# Patient Record
Sex: MALE | Race: ASIAN | NOT HISPANIC OR LATINO | URBAN - METROPOLITAN AREA
[De-identification: names, ages, dates, MRNs, and addresses within clinical notes are randomized per-mention and may not be internally consistent; named-entity substitution may affect disease eponyms.]

---

## 2017-04-06 ENCOUNTER — EMERGENCY (EMERGENCY)
Facility: HOSPITAL | Age: 22
LOS: 1 days | Discharge: HOME | End: 2017-04-06
Admitting: EMERGENCY MEDICINE
Payer: COMMERCIAL

## 2017-04-06 ENCOUNTER — EMERGENCY (EMERGENCY)
Facility: HOSPITAL | Age: 22
LOS: 1 days | Discharge: HOME | End: 2017-04-06
Attending: EMERGENCY MEDICINE | Admitting: EMERGENCY MEDICINE
Payer: MEDICAID

## 2017-04-06 VITALS
DIASTOLIC BLOOD PRESSURE: 71 MMHG | TEMPERATURE: 99 F | RESPIRATION RATE: 16 BRPM | SYSTOLIC BLOOD PRESSURE: 111 MMHG | HEART RATE: 81 BPM | OXYGEN SATURATION: 100 %

## 2017-04-06 VITALS
SYSTOLIC BLOOD PRESSURE: 148 MMHG | RESPIRATION RATE: 16 BRPM | TEMPERATURE: 98 F | OXYGEN SATURATION: 98 % | HEART RATE: 90 BPM | DIASTOLIC BLOOD PRESSURE: 84 MMHG

## 2017-04-06 PROCEDURE — 99053 MED SERV 10PM-8AM 24 HR FAC: CPT

## 2017-04-06 PROCEDURE — 99283 EMERGENCY DEPT VISIT LOW MDM: CPT | Mod: 25

## 2017-04-06 PROCEDURE — 99283 EMERGENCY DEPT VISIT LOW MDM: CPT

## 2017-04-06 PROCEDURE — 70450 CT HEAD/BRAIN W/O DYE: CPT | Mod: 26
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
## 2017-04-06 NOTE — ED PROVIDER NOTE - PLAN OF CARE
Rest, drink plenty of fluids.  Advance activity as tolerated.  Continue all previously prescribed medications as directed.  Take Motrin 600 mg (three 200 mg over the counter pills) every 8 hours as needed for moderate pain -- take with food. Take Tylenol 650mg (Two 325 mg pills) every 4-6 hours as needed for pain. You may have a headache associated with nausea in the next few hours/days. This is called a concussion and does not warrant return to the ED unless you develop significant worsening of pain, profuse vomiting, dizziness, changes in vision, difficulty walking/speaking,  weakness or numbness to your extremities.  Please avoid any contact sports or heavy lifting until re-evaluated and cleared by your primary care provider. Follow up with your primary care physician and neurology (referral list provided) in 48-72 hours- bring copies of your results.  Return to the ER for worsening or persistent symptoms, including but not limited to numbness, weakness, visual/auditory changes, worsening/persistent headache, and/or ANY NEW OR CONCERNING SYMPTOMS. If you have issues obtaining follow up, please call: [phone] to obtain a doctor or specialist who takes your insurance in your area.
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
## 2017-04-06 NOTE — ED PROVIDER NOTE - MEDICAL DECISION MAKING DETAILS
Pt is a 20 y/o M nonsmoker no PMHx p/w headache and lightheadedness today -- likely post-concussive syndrome, given questionable loc, will do head CT; left temporal pain likely 2/2 contusion; no e/o GCA, no neuro deficits, no e/o vertigo
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
## 2017-04-06 NOTE — ED PROVIDER NOTE - PROGRESS NOTE DETAILS
ISADORA Byrd:  CT shows no acute pathology.  Repeat neuro exam normal:  CN II-XII normal.  EOMI.  PERRLA.  Motor 5/5 UE and LE.  Sensation intact diffusely throughout.  Negative Rhomberg.  Negative pronator drift.  Point to point intact.  Pt ambulatory without assistance and without any gait disturbance; no ataxia.  Pt medically stable for discharge.  Pt to follow up with PMD.  Concussion precautions given to pt.
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
## 2017-04-06 NOTE — ED PROVIDER NOTE - CARE PLAN
Principal Discharge DX:	Post concussion syndrome  Instructions for follow-up, activity and diet:	Rest, drink plenty of fluids.  Advance activity as tolerated.  Continue all previously prescribed medications as directed.  Take Motrin 600 mg (three 200 mg over the counter pills) every 8 hours as needed for moderate pain -- take with food. Take Tylenol 650mg (Two 325 mg pills) every 4-6 hours as needed for pain. You may have a headache associated with nausea in the next few hours/days. This is called a concussion and does not warrant return to the ED unless you develop significant worsening of pain, profuse vomiting, dizziness, changes in vision, difficulty walking/speaking,  weakness or numbness to your extremities.  Please avoid any contact sports or heavy lifting until re-evaluated and cleared by your primary care provider. Follow up with your primary care physician and neurology (referral list provided) in 48-72 hours- bring copies of your results.  Return to the ER for worsening or persistent symptoms, including but not limited to numbness, weakness, visual/auditory changes, worsening/persistent headache, and/or ANY NEW OR CONCERNING SYMPTOMS. If you have issues obtaining follow up, please call: [phone] to obtain a doctor or specialist who takes your insurance in your area. Principal Discharge DX:	Post concussion syndrome  Instructions for follow-up, activity and diet:	Rest, drink plenty of fluids.  Advance activity as tolerated.  Continue all previously prescribed medications as directed.  Take Motrin 600 mg (three 200 mg over the counter pills) every 8 hours as needed for moderate pain -- take with food. Take Tylenol 650mg (Two 325 mg pills) every 4-6 hours as needed for pain. You may have a headache associated with nausea in the next few hours/days. This is called a concussion and does not warrant return to the ED unless you develop significant worsening of pain, profuse vomiting, dizziness, changes in vision, difficulty walking/speaking,  weakness or numbness to your extremities.  Please avoid any contact sports or heavy lifting until re-evaluated and cleared by your primary care provider. Follow up with your primary care physician and neurology (referral list provided) in 48-72 hours- bring copies of your results.  Return to the ER for worsening or persistent symptoms, including but not limited to numbness, weakness, visual/auditory changes, worsening/persistent headache, and/or ANY NEW OR CONCERNING SYMPTOMS. If you have issues obtaining follow up, please call: [phone] to obtain a doctor or specialist who takes your insurance in your area.

## 2017-04-06 NOTE — ED PROVIDER NOTE - NONTENDER LOCATION
right upper quadrant/umbilical/right costovertebral angle/left lower quadrant/suprapubic/left upper quadrant/periumbilical/right lower quadrant/left costovertebral angle
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
## 2017-08-30 NOTE — ED PROVIDER NOTE - PSH
No significant past surgical history
Skin normal color for race, warm, dry and intact. No evidence of rash.

## 2018-08-31 ENCOUNTER — EMERGENCY (EMERGENCY)
Facility: HOSPITAL | Age: 23
LOS: 1 days | Discharge: HOME | End: 2018-08-31
Attending: EMERGENCY MEDICINE
Payer: COMMERCIAL

## 2018-08-31 VITALS
TEMPERATURE: 98 F | RESPIRATION RATE: 17 BRPM | WEIGHT: 190.04 LBS | HEIGHT: 70 IN | SYSTOLIC BLOOD PRESSURE: 136 MMHG | HEART RATE: 83 BPM | DIASTOLIC BLOOD PRESSURE: 94 MMHG | OXYGEN SATURATION: 95 %

## 2018-08-31 PROCEDURE — 99284 EMERGENCY DEPT VISIT MOD MDM: CPT | Mod: 25

## 2018-09-01 VITALS
RESPIRATION RATE: 16 BRPM | TEMPERATURE: 98 F | DIASTOLIC BLOOD PRESSURE: 75 MMHG | OXYGEN SATURATION: 98 % | HEART RATE: 66 BPM | SYSTOLIC BLOOD PRESSURE: 125 MMHG

## 2018-09-01 PROCEDURE — 99284 EMERGENCY DEPT VISIT MOD MDM: CPT | Mod: 25

## 2018-09-01 PROCEDURE — 72125 CT NECK SPINE W/O DYE: CPT

## 2018-09-01 PROCEDURE — 72125 CT NECK SPINE W/O DYE: CPT | Mod: 26

## 2018-09-01 NOTE — ED ADULT NURSE REASSESSMENT NOTE - NS ED NURSE REASSESS COMMENT FT1
Report received from DICK Her. Pt resting comfortably with family at bedside. Awaiting radiology results. Safety maintained at all times, bed in lowest position. Will continue to monitor closely.
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
## 2018-12-05 NOTE — ED PROVIDER NOTE - NEUROLOGICAL GAIT WEIGHT BEARING
H&P signed by Darryl Blas MD at 18 09:08 AM      Author:  Darryl Blas MD Service:  (none) Author Type:  Physician     Filed:  18 09:08 AM Encounter Date:  3/10/2018 Status:  Signed     :  Darryl Blas MD (Physician)                                                                 [place] [id number]     NAME:  MADI COON      ADMISSION DATE:  2018     PHYSICIAN:  Darryl Blas M.D.     ROOM:  A[address]     YOB: 1960     MED REC#:  [id number]       ACCT #:  [id number]                               HISTORY AND PHYSICAL     HISTORY OF PRESENT ILLNESS:  The patient is a 58-year-old  male  with no significant previous medical history except for hypertension and  some urinary frequency.  Patient with no recent travel, illness, or  exposure.  No fevers, chills, or night sweats.  No dysuria, frequency,  or urgency.  No diarrhea.  No nausea or vomiting.  Approximately 2 days  ago, patient began with some epigastric lower quadrant pain, which was  somewhat crampy in nature.  Pain became somewhat worse and eventually  came to the left lower quadrant.  Initially said left lower quadrant,  but the way he points, it is suprapubic.  The patient had a CT scan,  which showed a 14 mm appendix with appendiceal inflammation and  appendicoliths.  Bowel and colon all looked normal.  No hernia.     ALLERGIES:  No known drug allergies.     PAST MEDICAL HISTORY:  Had previous back surgery.  He takes lisinopril  and something for his prostate.  He does not remember what its for  frequency and it is not Flomax.  No previous abdominal surgery.     SOCIAL HISTORY:  .     FAMILY HISTORY:  Parents are .     ALLERGIES:  No known drug allergies.     REVIEW OF SYSTEMS:  He has abdominal pain and some nausea.  Otherwise, 12-point is negative.     PHYSICAL  EXAMINATION:  General:   male, in no acute distress.  Vital Signs:  Temperature is 98, heart rate 85, and respirations 18.  HEENT:  Normocephalic, atraumatic.  Pupils equal and reactive to light  and accommodation.  Extraocular muscles are intact.  Sclerae nonicteric.  TMs clear.  Neck:  Supple.  Trachea is midline.  No bruit.  No adenopathy.  Heart:  Regular without murmur, gallop, or rub.  Lungs:  Clear.  Abdomen:  Soft, tender suprapubically and over McBurney's.  Extremities:  Warm and dry without cyanosis, clubbing, edema.  Cranial  nerves 2 through 12 are normal.  Toes are downgoing.     LABORATORY DATA:  White count is 9.0 with a left shift.  LFTs are  normal.  BUN is 15, creatinine is 0.85, glucose is 118, sodium is 142,  potassium 4.2.  Urinalysis is negative.     CT as above.     IMPRESSION:  Acute appendicitis.     PLAN:  Laparoscopic appendectomy, possible open.  Informed consent  obtained.  Risks, benefits explained.  Questions answered.  Risks  include, but not limited to pain, infection, bleeding, heart attack,  death, injury to internal organs, recurrence, abscess, fistula,  continued pain, negative appendectomy, or need for further surgery.  On  his physical exam, it was noted that he had an umbilical hernia and we  will repair this on way out.  Consent obtained, antibiotics, we will  proceed this evening.             ______________________________       RAJWINDER SALAS M.D.        WJO:MedQ  D:  03/09/2018 21:42:16  T:  03/10/2018 03:30:37  Document #: [id number]  Electronic Signatures:  RAJWINDER AQUINO) (Signed on 10-Mar-18 08:32)  Authored  MEDQ (Other) (Entered on 10-Mar-18 03:30)  Entered     Last Updated: 10-Mar-18 08:32 by RAJWINDER AQUINO)    [WO1.1M]    Revision History        User Key Date/Time User Provider Type Action    > WO1.1 03/12/18 09:08 AM Rajwinder Salas MD Physician Sign    M - Manual             normal

## 2018-12-22 ENCOUNTER — EMERGENCY (EMERGENCY)
Facility: HOSPITAL | Age: 23
LOS: 1 days | Discharge: HOME | End: 2018-12-22
Attending: EMERGENCY MEDICINE | Admitting: EMERGENCY MEDICINE
Payer: MEDICAID

## 2018-12-22 VITALS
TEMPERATURE: 99 F | DIASTOLIC BLOOD PRESSURE: 71 MMHG | OXYGEN SATURATION: 100 % | HEART RATE: 70 BPM | RESPIRATION RATE: 16 BRPM | SYSTOLIC BLOOD PRESSURE: 118 MMHG

## 2018-12-22 PROCEDURE — 99283 EMERGENCY DEPT VISIT LOW MDM: CPT | Mod: 25

## 2018-12-22 PROCEDURE — 12001 RPR S/N/AX/GEN/TRNK 2.5CM/<: CPT | Mod: F1

## 2018-12-22 PROCEDURE — 73140 X-RAY EXAM OF FINGER(S): CPT | Mod: 26,LT

## 2018-12-22 NOTE — ED PROVIDER NOTE - OBJECTIVE STATEMENT
23m presents with finger injury. patient was installing a window and the glass broke, cutting his finger. patient feels like there is something embedded in his finger, concerned glass or wood is in there. full rom. last tdap 2 years ago.

## 2018-12-22 NOTE — ED PROVIDER NOTE - MUSCULOSKELETAL, MLM
Spine appears normal, range of motion is not limited, no muscle tenderness. left second digit dip tenderness over laceration area

## 2018-12-22 NOTE — ED PROCEDURE NOTE - ATTENDING CONTRIBUTION TO CARE
I have participated in and supervised all key portions of the above procedures and agree with the above documentation. - Pebbles ESTEVES

## 2018-12-22 NOTE — ED PROVIDER NOTE - NS ED ATTENDING STATEMENT MOD
Treatment 1: Fluocinonide ointment
Show Cerave Line: Yes
Action 1: Discontinue
Sig For Treatment 1 (If Needed): twice daily
Action 2: Start
Samples Given: CeraVe Moisturizing Cream
Action 3: Continue
Detail Level: Zone
Treatment 2: Clobetasol ointment
Start Regimen: Prednisone 40mg PO QD x3days then 30mg PO QD x3days then 20mg PO QD x3days then 10mg PO QD x3days.
I have personally seen and examined this patient.  I have fully participated in the care of this patient. I have reviewed all pertinent clinical information, including history, physical exam, plan and the Resident’s note and agree except as noted.

## 2018-12-22 NOTE — ED PROVIDER NOTE - ATTENDING CONTRIBUTION TO CARE
Patient is a 22 yo M with no chronic medical problems here for evaluation of finger injury. Patient states he was pushing a window and it shattered, cutting his 2nd finger on his left hand. Last tetanus was 2 years ago. No other injuries.    exam: left 2nd finger with 1 cm cut, initially clotted, washed and examined    a/p: laceration, 1 cm, superficial, washed and explored - no FB noted. XR done and neg. Lac repaired - see procedure note.

## 2022-09-01 ENCOUNTER — EMERGENCY (EMERGENCY)
Facility: HOSPITAL | Age: 27
LOS: 1 days | Discharge: HOME | End: 2022-09-01
Attending: EMERGENCY MEDICINE | Admitting: EMERGENCY MEDICINE
Payer: MEDICAID

## 2022-09-01 VITALS
HEIGHT: 70 IN | HEART RATE: 66 BPM | OXYGEN SATURATION: 99 % | SYSTOLIC BLOOD PRESSURE: 146 MMHG | TEMPERATURE: 97 F | DIASTOLIC BLOOD PRESSURE: 80 MMHG | RESPIRATION RATE: 16 BRPM

## 2022-09-01 PROCEDURE — 73562 X-RAY EXAM OF KNEE 3: CPT | Mod: 26,RT

## 2022-09-01 PROCEDURE — 99284 EMERGENCY DEPT VISIT MOD MDM: CPT

## 2022-09-01 PROCEDURE — 73552 X-RAY EXAM OF FEMUR 2/>: CPT | Mod: 26,LT

## 2022-09-01 RX ORDER — IBUPROFEN 200 MG
600 TABLET ORAL ONCE
Refills: 0 | Status: DISCONTINUED | OUTPATIENT
Start: 2022-09-01 | End: 2022-09-01
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
## 2022-09-01 NOTE — ED PROVIDER NOTE - PATIENT PORTAL LINK FT
You can access the FollowMyHealth Patient Portal offered by [facility] by registering at the following website: [email or web address]. By joining [facility]’s FollowMyHealth portal, you will also be able to view your health information using other applications (apps) compatible with our system.

## 2023-01-31 NOTE — ED ADULT TRIAGE NOTE - PAIN RATING/NUMBER SCALE (0-10): REST
Assessment & Plan     1. Skin tag  Encouraged to watch for signs of infection.   - DESTRUC BENIGN/PREMAL,1ST LESION [21694]    2. Benign essential hypertension  Follow-up with cardiology as scheduled.       ED/Hospital discharge notes reviewed      Follow-up as needed     Randi Haddad NP  [facility] - JAMIL Carmen is a 75 year old presenting for the following health issues:  Derm Problem      HPI     Concern - skin tag removal   Onset: years  Description: skin tag right butttock  Intensity: moderate  Progression of Symptoms:  worsening  Accompanying Signs & Symptoms: bleeding  Previous history of similar problem: none  Precipitating factors:        Worsened by: sitting  Alleviating factors:        Improved by: none  Therapies tried and outcome:  none             Recent Labs   Lab Test 01/17/23  1203 12/01/22  1015 08/29/22  0912 05/27/22  0925 08/18/21  0939 05/12/21  1040 04/28/21  1017   A1C  --  5.6  --   --   --   --   --    LDL  --  138* 127* 115*   < >  --  154*   HDL  --  42* 40* 39*   < >  --  47*   TRIG  --  209* 260* 202*   < >  --  178*   ALT 96* 53* 40 40   < >  --  30   CR 0.95 0.99* 0.95 0.96   < > 1.19* 1.04   GFRESTIMATED 62 59* 62 61   < > 45* 53*   GFRESTBLACK  --   --   --   --   --  52* 61   POTASSIUM 4.1 4.2 4.2 4.3   < > 4.2 4.0   TSH  --  3.32 3.34 2.22   < >  --  4.14*    < > = values in this interval not displayed.      BP Readings from Last 3 Encounters:   02/03/23 (!) 140/68   01/17/23 118/68   12/01/22 (!) 142/76    Wt Readings from Last 3 Encounters:   02/03/23 81.6 kg (179 lb 12.8 oz)   01/17/23 82.1 kg (181 lb)   12/12/22 83.9 kg (185 lb)                   Review of Systems   Constitutional, HEENT, cardiovascular, pulmonary, gi and gu systems are negative, except as otherwise noted.      Objective    BP (!) 140/68 (BP Location: Left arm, Patient Position: Sitting, Cuff Size: Adult Regular)   Pulse 56   Temp 97.8  F (36.6  C)  (Tympanic)   Resp 12   Wt 81.6 kg (179 lb 12.8 oz)   SpO2 98%   BMI 32.89 kg/m    Body mass index is 32.89 kg/m .  Physical Exam   GENERAL: healthy, alert and no distress  MS: no gross musculoskeletal defects noted, no edema  SKIN: skin tag on right buttock - treated with liquid nitrogen for three freeze thaw cycles.  Tolerated procedure well.    PSYCH: mentation appears normal, affect normal/bright                           5

## 2024-10-26 ENCOUNTER — EMERGENCY (EMERGENCY)
Facility: HOSPITAL | Age: 29
LOS: 1 days | Discharge: LEFT BEFORE DISCHARGE | End: 2024-10-26
Attending: STUDENT IN AN ORGANIZED HEALTH CARE EDUCATION/TRAINING PROGRAM | Admitting: STUDENT IN AN ORGANIZED HEALTH CARE EDUCATION/TRAINING PROGRAM
Payer: MEDICAID

## 2024-10-26 VITALS
HEART RATE: 96 BPM | OXYGEN SATURATION: 100 % | RESPIRATION RATE: 19 BRPM | SYSTOLIC BLOOD PRESSURE: 145 MMHG | DIASTOLIC BLOOD PRESSURE: 98 MMHG | TEMPERATURE: 98 F

## 2024-10-26 VITALS
RESPIRATION RATE: 16 BRPM | DIASTOLIC BLOOD PRESSURE: 98 MMHG | SYSTOLIC BLOOD PRESSURE: 123 MMHG | OXYGEN SATURATION: 100 % | TEMPERATURE: 98 F | HEART RATE: 112 BPM

## 2024-10-26 LAB
ALBUMIN SERPL ELPH-MCNC: 4.7 G/DL — SIGNIFICANT CHANGE UP (ref 3.3–5)
ALP SERPL-CCNC: 70 U/L — SIGNIFICANT CHANGE UP (ref 40–120)
ALT FLD-CCNC: 38 U/L — SIGNIFICANT CHANGE UP (ref 10–45)
ANION GAP SERPL CALC-SCNC: 16 MMOL/L — SIGNIFICANT CHANGE UP (ref 5–17)
APTT BLD: 28.2 SEC — SIGNIFICANT CHANGE UP (ref 24.5–35.6)
AST SERPL-CCNC: 23 U/L — SIGNIFICANT CHANGE UP (ref 10–40)
BASOPHILS # BLD AUTO: 0.05 K/UL — SIGNIFICANT CHANGE UP (ref 0–0.2)
BASOPHILS NFR BLD AUTO: 0.7 % — SIGNIFICANT CHANGE UP (ref 0–2)
BILIRUB SERPL-MCNC: 0.5 MG/DL — SIGNIFICANT CHANGE UP (ref 0.2–1.2)
BUN SERPL-MCNC: 10 MG/DL — SIGNIFICANT CHANGE UP (ref 7–23)
CALCIUM SERPL-MCNC: 8.8 MG/DL — SIGNIFICANT CHANGE UP (ref 8.4–10.5)
CHLORIDE SERPL-SCNC: 101 MMOL/L — SIGNIFICANT CHANGE UP (ref 96–108)
CO2 SERPL-SCNC: 22 MMOL/L — SIGNIFICANT CHANGE UP (ref 22–31)
CREAT SERPL-MCNC: 0.85 MG/DL — SIGNIFICANT CHANGE UP (ref 0.5–1.3)
EGFR: 120 ML/MIN/1.73M2 — SIGNIFICANT CHANGE UP
EOSINOPHIL # BLD AUTO: 0.15 K/UL — SIGNIFICANT CHANGE UP (ref 0–0.5)
EOSINOPHIL NFR BLD AUTO: 2 % — SIGNIFICANT CHANGE UP (ref 0–6)
GLUCOSE SERPL-MCNC: 126 MG/DL — HIGH (ref 70–99)
HCT VFR BLD CALC: 45.1 % — SIGNIFICANT CHANGE UP (ref 39–50)
HGB BLD-MCNC: 15.4 G/DL — SIGNIFICANT CHANGE UP (ref 13–17)
IMM GRANULOCYTES NFR BLD AUTO: 0.8 % — SIGNIFICANT CHANGE UP (ref 0–0.9)
INR BLD: 0.93 RATIO — SIGNIFICANT CHANGE UP (ref 0.85–1.16)
LACTATE SERPL-SCNC: 2.1 MMOL/L — HIGH (ref 0.5–2)
LIDOCAIN IGE QN: 47 U/L — SIGNIFICANT CHANGE UP (ref 7–60)
LYMPHOCYTES # BLD AUTO: 2.84 K/UL — SIGNIFICANT CHANGE UP (ref 1–3.3)
LYMPHOCYTES # BLD AUTO: 37.5 % — SIGNIFICANT CHANGE UP (ref 13–44)
MCHC RBC-ENTMCNC: 29.1 PG — SIGNIFICANT CHANGE UP (ref 27–34)
MCHC RBC-ENTMCNC: 34.1 GM/DL — SIGNIFICANT CHANGE UP (ref 32–36)
MCV RBC AUTO: 85.1 FL — SIGNIFICANT CHANGE UP (ref 80–100)
MONOCYTES # BLD AUTO: 0.48 K/UL — SIGNIFICANT CHANGE UP (ref 0–0.9)
MONOCYTES NFR BLD AUTO: 6.3 % — SIGNIFICANT CHANGE UP (ref 2–14)
NEUTROPHILS # BLD AUTO: 3.99 K/UL — SIGNIFICANT CHANGE UP (ref 1.8–7.4)
NEUTROPHILS NFR BLD AUTO: 52.7 % — SIGNIFICANT CHANGE UP (ref 43–77)
NRBC # BLD: 0 /100 WBCS — SIGNIFICANT CHANGE UP (ref 0–0)
PLATELET # BLD AUTO: 382 K/UL — SIGNIFICANT CHANGE UP (ref 150–400)
POTASSIUM SERPL-MCNC: 3.7 MMOL/L — SIGNIFICANT CHANGE UP (ref 3.5–5.3)
POTASSIUM SERPL-SCNC: 3.7 MMOL/L — SIGNIFICANT CHANGE UP (ref 3.5–5.3)
PROT SERPL-MCNC: 8 G/DL — SIGNIFICANT CHANGE UP (ref 6–8.3)
PROTHROM AB SERPL-ACNC: 10.7 SEC — SIGNIFICANT CHANGE UP (ref 9.9–13.4)
RBC # BLD: 5.3 M/UL — SIGNIFICANT CHANGE UP (ref 4.2–5.8)
RBC # FLD: 12.7 % — SIGNIFICANT CHANGE UP (ref 10.3–14.5)
SODIUM SERPL-SCNC: 139 MMOL/L — SIGNIFICANT CHANGE UP (ref 135–145)
WBC # BLD: 7.57 K/UL — SIGNIFICANT CHANGE UP (ref 3.8–10.5)
WBC # FLD AUTO: 7.57 K/UL — SIGNIFICANT CHANGE UP (ref 3.8–10.5)

## 2024-10-26 RX ORDER — ACETAMINOPHEN 325 MG
1000 TABLET ORAL ONCE
Refills: 0 | Status: DISCONTINUED | OUTPATIENT
Start: 2024-10-26 | End: 2024-10-26

## 2024-10-26 RX ORDER — SODIUM CHLORIDE 0.9 % (FLUSH) 0.9 %
250 SYRINGE (ML) INJECTION ONCE
Refills: 0 | Status: DISCONTINUED | OUTPATIENT
Start: 2024-10-26 | End: 2024-10-26

## 2024-10-26 RX ORDER — TETANUS TOXOID, REDUCED DIPHTHERIA TOXOID AND ACELLULAR PERTUSSIS VACCINE, ADSORBED 5; 2.5; 8; 8; 2.5 [IU]/.5ML; [IU]/.5ML; UG/.5ML; UG/.5ML; UG/.5ML
0.5 SUSPENSION INTRAMUSCULAR ONCE
Refills: 0 | Status: DISCONTINUED | OUTPATIENT
Start: 2024-10-26 | End: 2024-10-29
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
## 2024-10-26 NOTE — ED PROVIDER NOTE - PHYSICAL EXAMINATION
Harjinder Mcgill DO (PGY3)     Primary Survey:  A- intact  B- B/L breath sounds  C- +2 distal pulses all extremities   D- GCS 15, pupils 3mm  E- laceration to L chest wall, dried blood to left nare, superficial abrasions over upper and lower left side of back     Secondary Survey:   Gen: NAD, AOx3  Head: NCAT  HEENT: EOMI, PEERLA  Lung: CTAB  CV: RRR  Abd: soft, NT, ND, no guarding, no rigidity, no rebound tenderness, no CVA tenderness   MSK: no visible deformities, ROM normal in UE/LE, no midline ttp to entire spine, pelvis stable  Neuro: No focal sensory or motor deficits. Sensation intact to light touch all extremities.  Skin: 3cm superficial wound over left chest wall without active bleeding
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
## 2024-10-26 NOTE — ED ADULT NURSE REASSESSMENT NOTE - NS ED NURSE REASSESS COMMENT FT1
Pt eloped increased in agitation, pt walked out. RN & ANM at bedside informing pt of risk of leaving and Pt eloped. RN notes increased agitation, repeatedly getting out of stretcher, telling staff he wants to go home & removed IV.   pt walked out complaining of chest pressure RN & ANM at bedside informing pt of risk of leaving and requesting to place pt on cardiac monitoring for evaluation.   Pt is a&ox3, speaking coherently. skim warm & dry.   MD Goldman made aware   Pt walked out of ED MD and OGM made aware
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
## 2024-10-27 NOTE — ED POST DISCHARGE NOTE - DETAILS
Left vm for call back 10/28: lvm to call back admin line. - Leah Andrade PA-C 10/29/24: Left voicemail with instructions to call back the aftercare line on both available numbers. If not call back by the end of the day will send certified letter. -Tahir Machado PA-C

## 2024-10-27 NOTE — ED POST DISCHARGE NOTE - OTHER COMMUNICATION
10/29/24- pt called back, discussed results and recommendation for outpatient GI f/u and endoscopy for esophageal thickening, rpt CT(s) for pulmonary nodules and to discuss results further with PCP. pt expressed verbal understanding of findings and recommendations. Pt also inquiring about clothes he may have left in the ED, offered that pt may return to check the lost-and-found. -Tahir Machado PA-C